# Patient Record
Sex: MALE | Race: WHITE | Employment: FULL TIME | URBAN - METROPOLITAN AREA
[De-identification: names, ages, dates, MRNs, and addresses within clinical notes are randomized per-mention and may not be internally consistent; named-entity substitution may affect disease eponyms.]

---

## 2021-01-25 ENCOUNTER — VIRTUAL VISIT (OUTPATIENT)
Dept: FAMILY MEDICINE CLINIC | Age: 41
End: 2021-01-25
Payer: COMMERCIAL

## 2021-01-25 DIAGNOSIS — Z86.79 HISTORY OF HYPERTENSION: ICD-10-CM

## 2021-01-25 DIAGNOSIS — Z87.438 HISTORY OF PROSTATITIS: ICD-10-CM

## 2021-01-25 DIAGNOSIS — Z79.899 CHRONIC PRESCRIPTION BENZODIAZEPINE USE: ICD-10-CM

## 2021-01-25 DIAGNOSIS — E11.9 DIABETES MELLITUS TYPE 2, DIET-CONTROLLED (HCC): Primary | ICD-10-CM

## 2021-01-25 DIAGNOSIS — Z86.39 HISTORY OF HYPERLIPIDEMIA: ICD-10-CM

## 2021-01-25 DIAGNOSIS — G62.9 PERIPHERAL POLYNEUROPATHY: ICD-10-CM

## 2021-01-25 DIAGNOSIS — D36.7 DERMOID CYST OF NECK: ICD-10-CM

## 2021-01-25 DIAGNOSIS — Z12.5 SCREENING FOR PROSTATE CANCER: ICD-10-CM

## 2021-01-25 PROCEDURE — 99204 OFFICE O/P NEW MOD 45 MIN: CPT | Performed by: FAMILY MEDICINE

## 2021-01-25 RX ORDER — LORAZEPAM 1 MG/1
TABLET ORAL
COMMUNITY
Start: 2021-01-08

## 2021-01-25 RX ORDER — GABAPENTIN 400 MG/1
400 CAPSULE ORAL 2 TIMES DAILY
Qty: 180 CAP | Refills: 1 | Status: SHIPPED | OUTPATIENT
Start: 2021-01-25

## 2021-01-25 NOTE — PROGRESS NOTES
Chief Complaint   Patient presents with   [address]     just moved back to [facility] Referral Follow Up     referral to  endo, uro, - due to DM and derm - due to lump in neck

## 2021-01-25 NOTE — PROGRESS NOTES
Divina Fontanez is a 36 y.o. male who was seen by synchronous (real-time) audio-video technology on 1/25/2021. Consent: Divina Fontanez, who was seen by synchronous (real-time) audio-video technology, and/or his healthcare decision maker, is aware that this patient-initiated, Telehealth encounter on 1/25/2021 is a billable service, with coverage as determined by his insurance carrier. He is aware that he may receive a bill and has provided verbal consent to proceed: Yes. Assessment & Plan:   1. Diabetes mellitus type 2, diet-controlled ([facility] [address]  Start with labs per orders, fasting  Off meds  Consider utility of statin/ace for protection in spite of controlled dm, will address at future encounter and make informed decision based on lab results as well  Does make patient qualify for 1b status for covid vaccine based on my understanding  - LIPID PANEL; Future  - HEMOGLOBIN A1C WITH EAG; Future  - CBC W/O DIFF; Future  - METABOLIC PANEL, COMPREHENSIVE; Future  - MICROALBUMIN, UR, RAND W/ MICROALB/CREAT RATIO; Future    2. History of prostatitis  Pt would like f/u with urology, referral placed  Also due for routine psa screening  - REFERRAL TO UROLOGY  - PSA SCREENING (SCREENING); Future    3. Dermoid cyst of neck  Would like to see derm for recurrent dermoid cyst of L neck  - REFERRAL TO DERMATOLOGY    4. Screening for prostate cancer  As above  - PSA SCREENING (SCREENING); Future    5. Peripheral polyneuropathy  Ongoing for 2 years, mgmt with gabapentin 400 bid,  reviewed, med ordered  - gabapentin (NEURONTIN) 400 mg capsule; Take 1 Cap by mouth two (2) times a day. Max Daily Amount: 800 mg. Dispense: 180 Cap; Refill: 1    6. Chronic prescription benzodiazepine use  Prescribed by psych, discussed risks of chronic benzo use    7. History of hypertension  Pt reports resolved once weight loss had happened    8.  History of hyperlipidemia  Pt reports resolved once weight loss had happened    Go for fasting labs  Will review in results note an f/u if necessary    Pending up coming appt with [facility]          Pt was counseled on risks, benefits and alternatives of treatment options. All questions were asked and answered and the patient was agreeable with the treatment plan  Subjective:   Divina Fontanez is a 36 y.o. male who was seen for Establish Care (just moved back to [place] ) and Referral Follow Up (referral to  endo, uro, - due to DM and derm - due to lump in neck )      Home: house with parents  Work: [employer]  Last PCP: was going regularly but generally was seeing his specialists most often  Specialists: urology, endocrinologists, would like to see dermatology for a cyst behind his ear that is growing    The patient reports he is a diet controlled dm--he was asymptomatic on his diagnosis, he was initially put on insulin and within a few weeks he was having profound hypoglycemia, he stopped any meds, his recheck was OK and he's stayed stable since then, he worked on his diet and he only gets his bg up when he is sick, then he very occasionally stress dosed himself with short acting insulin per his report  Last a1c hasn't been in a while.     Neuropathy: takes gabapentin 400 bid, he has thought it had to do with blood sugars but not clear    He reports he used to weigh about 240 lbs, he exercised and cut diet, he got down to 165 and has held steady since then, he at the time had high bp and hld but that has gotten better for him since the weight loss and maintenance    When the patient was in [place] he was seeing psychiatry, he is on ativan from this specialist, he was having a lot of stress causing his blood sugars to rise, he was put on \"a bunch of different stuff at the beginning\" and he's been on ativan 1 qam, 1/2 q lunch and 1 qpm for about 5 years now  Medications, allergies, PMH, PSH, SOCH, LEONARDO CASTELLON OF [facility] reviewed and updated per routine protocol, see chart for review and changes if not noted here. ROS  A 12 point review of systems was negative except as noted here or in the HPI. Objective:   Vital Signs: (As obtained by patient/caregiver at home)  Patient-Reported Vitals 1/25/2021   Patient-Reported Weight 165lbs        [INSTRUCTIONS:  \"[x]\" Indicates a positive item  \"[]\" Indicates a negative item  -- DELETE ALL ITEMS NOT EXAMINED]    Constitutional: [x] Appears well-developed and well-nourished [x] No apparent distress      [] Abnormal -     Mental status: [x] Alert and awake  [x] Oriented to person/place/time [x] Able to follow commands    [] Abnormal -     Eyes:   EOM    [x]  Normal    [] Abnormal -   Sclera  [x]  Normal    [] Abnormal -          Discharge [x]  None visible   [] Abnormal -     HENT: [x] Normocephalic, atraumatic  [] Abnormal -   [x] Mouth/Throat: Mucous membranes are moist    External Ears [x] Normal  [] Abnormal -    Neck: [x] No visualized mass [] Abnormal -     Pulmonary/Chest: [x] Respiratory effort normal   [x] No visualized signs of difficulty breathing or respiratory distress        [] Abnormal -      Musculoskeletal:   [] Normal gait with no signs of ataxia         [x] Normal range of motion of neck        [] Abnormal -     Neurological:        [x] No Facial Asymmetry (Cranial nerve 7 motor function) (limited exam due to video visit)          [x] No gaze palsy        [] Abnormal -          Skin:        [x] No significant exanthematous lesions or discoloration noted on facial skin         [] Abnormal -            Psychiatric:       [x] Normal Affect [] Abnormal -        [x] No Hallucinations    Other pertinent observable physical exam findings:well appearing, no distress, non toxic    We discussed the expected course, resolution and complications of the diagnosis(es) in detail. Medication risks, benefits, costs, interactions, and alternatives were discussed as indicated.   I advised him to contact the office if his condition worsens, changes or fails to improve as anticipated. He expressed understanding with the diagnosis(es) and plan. Vignesh Carlisle is a 36 y.o. male who was evaluated by a video visit encounter for concerns as above. Patient identification was verified prior to start of the visit. A caregiver was present when appropriate. Due to this being a TeleHealth encounter (During [facility] public health emergency), evaluation of the following organ systems was limited: Vitals/Constitutional/EENT/Resp/CV/GI//MS/Neuro/Skin/Heme-Lymph-Imm. Pursuant to the emergency declaration under the [facility], [facility] waiver authority and the [employer] Act, this Virtual  Visit was conducted, with patient's (and/or legal guardian's) consent, to reduce the patient's risk of exposure to COVID-19 and provide necessary medical care. Services were provided through a video synchronous discussion virtually to substitute for in-person clinic visit. Patient and provider were located at their individual homes. Sonja Kelly MD  [facility]  01/25/21 1:38 PM     Portions of this note may have been populated using smart dictation software and may have \"sounds-like\" errors present.

## 2021-01-26 ENCOUNTER — TELEPHONE (OUTPATIENT)
Dept: FAMILY MEDICINE CLINIC | Age: 41
End: 2021-01-26

## 2021-01-26 NOTE — TELEPHONE ENCOUNTER
----- Message from Sushma Forde MD sent at 1/25/2021  1:59 PM EST -----  Please mail referrals to patients home

## 2024-09-04 ENCOUNTER — HOSPITAL ENCOUNTER (INPATIENT)
Facility: HOSPITAL | Age: 44
LOS: 2 days | Discharge: HOME | End: 2024-09-06
Attending: EMERGENCY MEDICINE | Admitting: STUDENT IN AN ORGANIZED HEALTH CARE EDUCATION/TRAINING PROGRAM
Payer: COMMERCIAL

## 2024-09-04 ENCOUNTER — APPOINTMENT (OUTPATIENT)
Facility: HOSPITAL | Age: 44
End: 2024-09-04
Payer: COMMERCIAL

## 2024-09-04 DIAGNOSIS — F10.931 DTS (DELIRIUM TREMENS) (HCC): Primary | ICD-10-CM

## 2024-09-04 DIAGNOSIS — F10.931 DELIRIUM TREMENS (HCC): ICD-10-CM

## 2024-09-04 DIAGNOSIS — K92.2 UPPER GI BLEED: ICD-10-CM

## 2024-09-04 LAB
ALBUMIN SERPL-MCNC: 3.2 G/DL (ref 3.5–5)
ALBUMIN/GLOB SERPL: 0.8 (ref 1.1–2.2)
ALP SERPL-CCNC: 85 U/L (ref 45–117)
ALT SERPL-CCNC: 91 U/L (ref 12–78)
ANION GAP SERPL CALC-SCNC: 12 MMOL/L (ref 5–15)
APAP SERPL-MCNC: 6 UG/ML (ref 10–30)
AST SERPL-CCNC: 58 U/L (ref 15–37)
BASOPHILS # BLD: 0 K/UL (ref 0–0.1)
BASOPHILS NFR BLD: 0 % (ref 0–1)
BILIRUB SERPL-MCNC: 1.6 MG/DL (ref 0.2–1)
BUN SERPL-MCNC: 4 MG/DL (ref 6–20)
BUN/CREAT SERPL: 5 (ref 12–20)
CALCIUM SERPL-MCNC: 9.5 MG/DL (ref 8.5–10.1)
CHLORIDE SERPL-SCNC: 101 MMOL/L (ref 97–108)
CO2 SERPL-SCNC: 20 MMOL/L (ref 21–32)
CREAT SERPL-MCNC: 0.79 MG/DL (ref 0.7–1.3)
DIFFERENTIAL METHOD BLD: ABNORMAL
EOSINOPHIL # BLD: 0 K/UL (ref 0–0.4)
EOSINOPHIL NFR BLD: 0 % (ref 0–7)
ERYTHROCYTE [DISTWIDTH] IN BLOOD BY AUTOMATED COUNT: 12.4 % (ref 11.5–14.5)
ETHANOL SERPL-MCNC: 63 MG/DL (ref 0–0.08)
ETHANOL SERPL-MCNC: <10 MG/DL (ref 0–0.08)
GLOBULIN SER CALC-MCNC: 3.8 G/DL (ref 2–4)
GLUCOSE BLD STRIP.AUTO-MCNC: 215 MG/DL (ref 65–117)
GLUCOSE SERPL-MCNC: 197 MG/DL (ref 65–100)
HCT VFR BLD AUTO: 42 % (ref 36.6–50.3)
HCT VFR BLD AUTO: 44.9 % (ref 36.6–50.3)
HGB BLD-MCNC: 15.3 G/DL (ref 12.1–17)
HGB BLD-MCNC: 16.2 G/DL (ref 12.1–17)
IMM GRANULOCYTES # BLD AUTO: 0 K/UL
IMM GRANULOCYTES NFR BLD AUTO: 0 %
LIPASE SERPL-CCNC: 21 U/L (ref 13–75)
LYMPHOCYTES # BLD: 0.3 K/UL (ref 0.8–3.5)
LYMPHOCYTES NFR BLD: 11 % (ref 12–49)
MAGNESIUM SERPL-MCNC: 1.7 MG/DL (ref 1.6–2.4)
MCH RBC QN AUTO: 36.6 PG (ref 26–34)
MCHC RBC AUTO-ENTMCNC: 36.1 G/DL (ref 30–36.5)
MCV RBC AUTO: 101.4 FL (ref 80–99)
MONOCYTES # BLD: 0 K/UL (ref 0–1)
MONOCYTES NFR BLD: 1 % (ref 5–13)
NEUTS BAND NFR BLD MANUAL: 8 % (ref 0–6)
NEUTS SEG # BLD: 2.1 K/UL (ref 1.8–8)
NEUTS SEG NFR BLD: 80 % (ref 32–75)
NRBC # BLD: 0 K/UL (ref 0–0.01)
NRBC BLD-RTO: 0 PER 100 WBC
PLATELET # BLD AUTO: 108 K/UL (ref 150–400)
PMV BLD AUTO: 11.8 FL (ref 8.9–12.9)
POTASSIUM SERPL-SCNC: 3.6 MMOL/L (ref 3.5–5.1)
PROT SERPL-MCNC: 7 G/DL (ref 6.4–8.2)
RBC # BLD AUTO: 4.43 M/UL (ref 4.1–5.7)
RBC MORPH BLD: ABNORMAL
SALICYLATES SERPL-MCNC: <1.7 MG/DL (ref 2.8–20)
SERVICE CMNT-IMP: ABNORMAL
SODIUM SERPL-SCNC: 133 MMOL/L (ref 136–145)
TROPONIN I SERPL HS-MCNC: 4 NG/L (ref 0–76)
WBC # BLD AUTO: 2.4 K/UL (ref 4.1–11.1)

## 2024-09-04 PROCEDURE — 82962 GLUCOSE BLOOD TEST: CPT

## 2024-09-04 PROCEDURE — 85014 HEMATOCRIT: CPT

## 2024-09-04 PROCEDURE — 96374 THER/PROPH/DIAG INJ IV PUSH: CPT

## 2024-09-04 PROCEDURE — 2580000003 HC RX 258: Performed by: STUDENT IN AN ORGANIZED HEALTH CARE EDUCATION/TRAINING PROGRAM

## 2024-09-04 PROCEDURE — 85018 HEMOGLOBIN: CPT

## 2024-09-04 PROCEDURE — 82077 ASSAY SPEC XCP UR&BREATH IA: CPT

## 2024-09-04 PROCEDURE — 6360000002 HC RX W HCPCS: Performed by: EMERGENCY MEDICINE

## 2024-09-04 PROCEDURE — 86900 BLOOD TYPING SEROLOGIC ABO: CPT

## 2024-09-04 PROCEDURE — 86850 RBC ANTIBODY SCREEN: CPT

## 2024-09-04 PROCEDURE — 99285 EMERGENCY DEPT VISIT HI MDM: CPT

## 2024-09-04 PROCEDURE — 85025 COMPLETE CBC W/AUTO DIFF WBC: CPT

## 2024-09-04 PROCEDURE — 71045 X-RAY EXAM CHEST 1 VIEW: CPT

## 2024-09-04 PROCEDURE — 83036 HEMOGLOBIN GLYCOSYLATED A1C: CPT

## 2024-09-04 PROCEDURE — 2580000003 HC RX 258: Performed by: EMERGENCY MEDICINE

## 2024-09-04 PROCEDURE — 83690 ASSAY OF LIPASE: CPT

## 2024-09-04 PROCEDURE — 93005 ELECTROCARDIOGRAM TRACING: CPT | Performed by: EMERGENCY MEDICINE

## 2024-09-04 PROCEDURE — 96361 HYDRATE IV INFUSION ADD-ON: CPT

## 2024-09-04 PROCEDURE — 70450 CT HEAD/BRAIN W/O DYE: CPT

## 2024-09-04 PROCEDURE — 80143 DRUG ASSAY ACETAMINOPHEN: CPT

## 2024-09-04 PROCEDURE — 36415 COLL VENOUS BLD VENIPUNCTURE: CPT

## 2024-09-04 PROCEDURE — 80053 COMPREHEN METABOLIC PANEL: CPT

## 2024-09-04 PROCEDURE — 96375 TX/PRO/DX INJ NEW DRUG ADDON: CPT

## 2024-09-04 PROCEDURE — 1100000000 HC RM PRIVATE

## 2024-09-04 PROCEDURE — 86901 BLOOD TYPING SEROLOGIC RH(D): CPT

## 2024-09-04 PROCEDURE — 83735 ASSAY OF MAGNESIUM: CPT

## 2024-09-04 PROCEDURE — 84484 ASSAY OF TROPONIN QUANT: CPT

## 2024-09-04 PROCEDURE — 6370000000 HC RX 637 (ALT 250 FOR IP): Performed by: STUDENT IN AN ORGANIZED HEALTH CARE EDUCATION/TRAINING PROGRAM

## 2024-09-04 PROCEDURE — 80179 DRUG ASSAY SALICYLATE: CPT

## 2024-09-04 RX ORDER — ONDANSETRON 4 MG/1
4 TABLET, ORALLY DISINTEGRATING ORAL EVERY 8 HOURS PRN
Status: DISCONTINUED | OUTPATIENT
Start: 2024-09-04 | End: 2024-09-06 | Stop reason: HOSPADM

## 2024-09-04 RX ORDER — POLYETHYLENE GLYCOL 3350 17 G/17G
17 POWDER, FOR SOLUTION ORAL DAILY PRN
Status: DISCONTINUED | OUTPATIENT
Start: 2024-09-04 | End: 2024-09-06 | Stop reason: HOSPADM

## 2024-09-04 RX ORDER — POTASSIUM CHLORIDE 7.45 MG/ML
10 INJECTION INTRAVENOUS PRN
Status: DISCONTINUED | OUTPATIENT
Start: 2024-09-04 | End: 2024-09-06 | Stop reason: HOSPADM

## 2024-09-04 RX ORDER — PHENOBARBITAL 32.4 MG/1
32.4 TABLET ORAL 4 TIMES DAILY
Status: COMPLETED | OUTPATIENT
Start: 2024-09-04 | End: 2024-09-05

## 2024-09-04 RX ORDER — ONDANSETRON 2 MG/ML
4 INJECTION INTRAMUSCULAR; INTRAVENOUS EVERY 6 HOURS PRN
Status: DISCONTINUED | OUTPATIENT
Start: 2024-09-04 | End: 2024-09-06 | Stop reason: HOSPADM

## 2024-09-04 RX ORDER — 0.9 % SODIUM CHLORIDE 0.9 %
2000 INTRAVENOUS SOLUTION INTRAVENOUS ONCE
Status: COMPLETED | OUTPATIENT
Start: 2024-09-04 | End: 2024-09-04

## 2024-09-04 RX ORDER — MULTIVITAMIN WITH IRON
1 TABLET ORAL DAILY
Status: DISCONTINUED | OUTPATIENT
Start: 2024-09-05 | End: 2024-09-06 | Stop reason: HOSPADM

## 2024-09-04 RX ORDER — ACETAMINOPHEN 650 MG/1
650 SUPPOSITORY RECTAL EVERY 6 HOURS PRN
Status: DISCONTINUED | OUTPATIENT
Start: 2024-09-04 | End: 2024-09-06 | Stop reason: HOSPADM

## 2024-09-04 RX ORDER — PHENOBARBITAL 32.4 MG/1
32.4 TABLET ORAL 2 TIMES DAILY
Status: COMPLETED | OUTPATIENT
Start: 2024-09-05 | End: 2024-09-06

## 2024-09-04 RX ORDER — FOLIC ACID 1 MG/1
1 TABLET ORAL DAILY
Status: DISCONTINUED | OUTPATIENT
Start: 2024-09-05 | End: 2024-09-06 | Stop reason: HOSPADM

## 2024-09-04 RX ORDER — GAUZE BANDAGE 2" X 2"
100 BANDAGE TOPICAL DAILY
Status: DISCONTINUED | OUTPATIENT
Start: 2024-09-05 | End: 2024-09-06 | Stop reason: HOSPADM

## 2024-09-04 RX ORDER — INSULIN LISPRO 100 [IU]/ML
0-8 INJECTION, SOLUTION INTRAVENOUS; SUBCUTANEOUS EVERY 4 HOURS
Status: DISCONTINUED | OUTPATIENT
Start: 2024-09-04 | End: 2024-09-06 | Stop reason: HOSPADM

## 2024-09-04 RX ORDER — SODIUM CHLORIDE 0.9 % (FLUSH) 0.9 %
5-40 SYRINGE (ML) INJECTION EVERY 12 HOURS SCHEDULED
Status: DISCONTINUED | OUTPATIENT
Start: 2024-09-04 | End: 2024-09-06 | Stop reason: HOSPADM

## 2024-09-04 RX ORDER — SODIUM CHLORIDE 0.9 % (FLUSH) 0.9 %
5-40 SYRINGE (ML) INJECTION PRN
Status: DISCONTINUED | OUTPATIENT
Start: 2024-09-04 | End: 2024-09-06 | Stop reason: HOSPADM

## 2024-09-04 RX ORDER — PHENOBARBITAL 32.4 MG/1
16.2 TABLET ORAL 2 TIMES DAILY
Status: DISCONTINUED | OUTPATIENT
Start: 2024-09-06 | End: 2024-09-06 | Stop reason: HOSPADM

## 2024-09-04 RX ORDER — SODIUM CHLORIDE 9 MG/ML
INJECTION, SOLUTION INTRAVENOUS PRN
Status: DISCONTINUED | OUTPATIENT
Start: 2024-09-04 | End: 2024-09-06 | Stop reason: HOSPADM

## 2024-09-04 RX ORDER — LORAZEPAM 2 MG/ML
2 INJECTION INTRAMUSCULAR ONCE
Status: DISCONTINUED | OUTPATIENT
Start: 2024-09-04 | End: 2024-09-06 | Stop reason: HOSPADM

## 2024-09-04 RX ORDER — POTASSIUM CHLORIDE 750 MG/1
40 TABLET, EXTENDED RELEASE ORAL PRN
Status: DISCONTINUED | OUTPATIENT
Start: 2024-09-04 | End: 2024-09-06 | Stop reason: HOSPADM

## 2024-09-04 RX ORDER — PHENOBARBITAL 32.4 MG/1
16.2 TABLET ORAL EVERY 6 HOURS PRN
Status: DISCONTINUED | OUTPATIENT
Start: 2024-09-06 | End: 2024-09-06 | Stop reason: HOSPADM

## 2024-09-04 RX ORDER — DEXTROSE MONOHYDRATE 100 MG/ML
INJECTION, SOLUTION INTRAVENOUS CONTINUOUS PRN
Status: DISCONTINUED | OUTPATIENT
Start: 2024-09-04 | End: 2024-09-06 | Stop reason: HOSPADM

## 2024-09-04 RX ORDER — OCTREOTIDE ACETATE 100 UG/ML
50 INJECTION, SOLUTION INTRAVENOUS; SUBCUTANEOUS ONCE
Status: COMPLETED | OUTPATIENT
Start: 2024-09-04 | End: 2024-09-04

## 2024-09-04 RX ORDER — PHENOBARBITAL 32.4 MG/1
32.4 TABLET ORAL EVERY 6 HOURS PRN
Status: DISCONTINUED | OUTPATIENT
Start: 2024-09-04 | End: 2024-09-06 | Stop reason: HOSPADM

## 2024-09-04 RX ORDER — ACETAMINOPHEN 325 MG/1
650 TABLET ORAL EVERY 6 HOURS PRN
Status: DISCONTINUED | OUTPATIENT
Start: 2024-09-04 | End: 2024-09-06 | Stop reason: HOSPADM

## 2024-09-04 RX ORDER — LORAZEPAM 2 MG/ML
1 INJECTION INTRAMUSCULAR ONCE
Status: COMPLETED | OUTPATIENT
Start: 2024-09-04 | End: 2024-09-04

## 2024-09-04 RX ORDER — MAGNESIUM SULFATE IN WATER 40 MG/ML
2000 INJECTION, SOLUTION INTRAVENOUS PRN
Status: DISCONTINUED | OUTPATIENT
Start: 2024-09-04 | End: 2024-09-06 | Stop reason: HOSPADM

## 2024-09-04 RX ADMIN — SODIUM CHLORIDE, PRESERVATIVE FREE 10 ML: 5 INJECTION INTRAVENOUS at 22:10

## 2024-09-04 RX ADMIN — LORAZEPAM 1 MG: 2 INJECTION INTRAMUSCULAR; INTRAVENOUS at 19:34

## 2024-09-04 RX ADMIN — PANTOPRAZOLE SODIUM 80 MG: 40 INJECTION, POWDER, FOR SOLUTION INTRAVENOUS at 19:30

## 2024-09-04 RX ADMIN — SODIUM CHLORIDE 2000 ML: 9 INJECTION, SOLUTION INTRAVENOUS at 19:42

## 2024-09-04 RX ADMIN — OCTREOTIDE ACETATE 50 MCG: 100 INJECTION, SOLUTION INTRAVENOUS; SUBCUTANEOUS at 19:39

## 2024-09-04 RX ADMIN — PHENOBARBITAL 32.4 MG: 32.4 TABLET ORAL at 22:10

## 2024-09-04 ASSESSMENT — ENCOUNTER SYMPTOMS
COLOR CHANGE: 0
VOMITING: 0
BACK PAIN: 0
SHORTNESS OF BREATH: 0
NAUSEA: 0
DIARRHEA: 0
CONSTIPATION: 0
ABDOMINAL PAIN: 0

## 2024-09-04 ASSESSMENT — PAIN - FUNCTIONAL ASSESSMENT: PAIN_FUNCTIONAL_ASSESSMENT: NONE - DENIES PAIN

## 2024-09-04 NOTE — ED TRIAGE NOTES
Pt presents with EMS for c/o near syncopal episode. Denies LOC or fall/injury. Endorses lethargy. Per EMS low BP and ST in route. .     Hx ETOH abuse.

## 2024-09-04 NOTE — ED PROVIDER NOTES
[facility] EMERGENCY DEPT  EMERGENCY DEPARTMENT ENCOUNTER      Pt Name: Alex Sanchez  MRN: [id number]  Birthdate 1980  Date of evaluation: 9/4/2024  Provider: Irving Gracia MD    CHIEF COMPLAINT       Chief Complaint   Patient presents with    Fatigue         HISTORY OF PRESENT ILLNESS   (Location/Symptom, Timing/Onset, Context/Setting, Quality, Duration, Modifying Factors, Severity)  Note limiting factors.   Alex Sanchez is a 44 y.o. male who presents to the emergency department      The history is provided by the patient, a relative and the EMS personnel. No  was used.   Loss of Consciousness  Episode history:  Single  Most recent episode:  Today  Chronicity:  New  Witnessed: yes    Ineffective treatments:  None tried  Associated symptoms: no chest pain, no confusion, no dizziness, no fever, no headaches, no nausea, no palpitations, no seizures, no shortness of breath, no vomiting and no weakness        Nursing Notes were reviewed.    REVIEW OF SYSTEMS    (2-9 systems for level 4, 10 or more for level 5)     Review of Systems   Constitutional:  Positive for fatigue. Negative for activity change, chills and fever.   HENT:  Negative for nosebleeds.    Eyes:  Negative for visual disturbance.   Respiratory:  Negative for shortness of breath.    Cardiovascular:  Positive for syncope. Negative for chest pain and palpitations.   Gastrointestinal:  Negative for abdominal pain, constipation, diarrhea, nausea and vomiting.   Genitourinary:  Negative for difficulty urinating, dysuria, hematuria and urgency.   Musculoskeletal:  Negative for back pain, neck pain and neck stiffness.   Skin:  Negative for color change.   Allergic/Immunologic: Negative for immunocompromised state.   Neurological:  Negative for dizziness, seizures, syncope, weakness, light-headedness, numbness and headaches.   Psychiatric/Behavioral:  Negative for behavioral problems, confusion, hallucinations, self-injury and

## 2024-09-05 ENCOUNTER — ANESTHESIA EVENT (OUTPATIENT)
Facility: HOSPITAL | Age: 44
End: 2024-09-05
Payer: COMMERCIAL

## 2024-09-05 ENCOUNTER — ANESTHESIA (OUTPATIENT)
Facility: HOSPITAL | Age: 44
End: 2024-09-05
Payer: COMMERCIAL

## 2024-09-05 LAB
ABO + RH BLD: NORMAL
AMPHET UR QL SCN: NEGATIVE
ANION GAP SERPL CALC-SCNC: 8 MMOL/L (ref 2–12)
APPEARANCE UR: ABNORMAL
BACTERIA URNS QL MICRO: ABNORMAL /HPF
BARBITURATES UR QL SCN: NEGATIVE
BASOPHILS # BLD: 0 K/UL (ref 0–0.1)
BASOPHILS NFR BLD: 0 % (ref 0–1)
BENZODIAZ UR QL: NEGATIVE
BILIRUB UR QL: NEGATIVE
BLOOD GROUP ANTIBODIES SERPL: NORMAL
BUN SERPL-MCNC: 4 MG/DL (ref 6–20)
BUN/CREAT SERPL: 5 (ref 12–20)
CALCIUM SERPL-MCNC: 8.6 MG/DL (ref 8.5–10.1)
CANNABINOIDS UR QL SCN: NEGATIVE
CHLORIDE SERPL-SCNC: 102 MMOL/L (ref 97–108)
CO2 SERPL-SCNC: 23 MMOL/L (ref 21–32)
COCAINE UR QL SCN: NEGATIVE
COLOR UR: ABNORMAL
CREAT SERPL-MCNC: 0.73 MG/DL (ref 0.7–1.3)
DIFFERENTIAL METHOD BLD: ABNORMAL
EKG ATRIAL RATE: 144 BPM
EKG DIAGNOSIS: NORMAL
EKG P AXIS: 67 DEGREES
EKG P-R INTERVAL: 128 MS
EKG Q-T INTERVAL: 342 MS
EKG QRS DURATION: 68 MS
EKG QTC CALCULATION (BAZETT): 529 MS
EKG R AXIS: 78 DEGREES
EKG T AXIS: 92 DEGREES
EKG VENTRICULAR RATE: 144 BPM
EOSINOPHIL # BLD: 0 K/UL (ref 0–0.4)
EOSINOPHIL NFR BLD: 0 % (ref 0–7)
EPITH CASTS URNS QL MICRO: ABNORMAL /LPF
ERYTHROCYTE [DISTWIDTH] IN BLOOD BY AUTOMATED COUNT: 12.4 % (ref 11.5–14.5)
EST. AVERAGE GLUCOSE BLD GHB EST-MCNC: 212 MG/DL
EST. AVERAGE GLUCOSE BLD GHB EST-MCNC: 217 MG/DL
EST. AVERAGE GLUCOSE BLD GHB EST-MCNC: 235 MG/DL
FOLATE SERPL-MCNC: 2.7 NG/ML (ref 5–21)
GLUCOSE BLD STRIP.AUTO-MCNC: 160 MG/DL (ref 65–117)
GLUCOSE BLD STRIP.AUTO-MCNC: 172 MG/DL (ref 65–117)
GLUCOSE BLD STRIP.AUTO-MCNC: 182 MG/DL (ref 65–117)
GLUCOSE BLD STRIP.AUTO-MCNC: 200 MG/DL (ref 65–117)
GLUCOSE BLD STRIP.AUTO-MCNC: 230 MG/DL (ref 65–117)
GLUCOSE SERPL-MCNC: 224 MG/DL (ref 65–100)
GLUCOSE UR STRIP.AUTO-MCNC: 500 MG/DL
HBA1C MFR BLD: 9 % (ref 4–5.6)
HBA1C MFR BLD: 9.2 % (ref 4–5.6)
HBA1C MFR BLD: 9.8 % (ref 4–5.6)
HCT VFR BLD AUTO: 35.3 % (ref 36.6–50.3)
HCT VFR BLD AUTO: 35.8 % (ref 36.6–50.3)
HCT VFR BLD AUTO: 36.1 % (ref 36.6–50.3)
HELIOBACTER PYLORI ID: NORMAL
HGB BLD-MCNC: 12.7 G/DL (ref 12.1–17)
HGB BLD-MCNC: 12.7 G/DL (ref 12.1–17)
HGB BLD-MCNC: 13.2 G/DL (ref 12.1–17)
HGB UR QL STRIP: NEGATIVE
HYALINE CASTS URNS QL MICRO: ABNORMAL /LPF (ref 0–2)
IMM GRANULOCYTES # BLD AUTO: 0.2 K/UL (ref 0–0.04)
IMM GRANULOCYTES NFR BLD AUTO: 2 % (ref 0–0.5)
INR PPP: 1.1 (ref 0.9–1.1)
KETONES UR QL STRIP.AUTO: ABNORMAL MG/DL
LEUKOCYTE ESTERASE UR QL STRIP.AUTO: ABNORMAL
LYMPHOCYTES # BLD: 0.2 K/UL (ref 0.8–3.5)
LYMPHOCYTES NFR BLD: 2 % (ref 12–49)
Lab: NORMAL
MCH RBC QN AUTO: 36 PG (ref 26–34)
MCHC RBC AUTO-ENTMCNC: 35.5 G/DL (ref 30–36.5)
MCV RBC AUTO: 101.4 FL (ref 80–99)
METHADONE UR QL: NEGATIVE
MONOCYTES # BLD: 0.3 K/UL (ref 0–1)
MONOCYTES NFR BLD: 3 % (ref 5–13)
NEUTS SEG # BLD: 9.5 K/UL (ref 1.8–8)
NEUTS SEG NFR BLD: 93 % (ref 32–75)
NITRITE UR QL STRIP.AUTO: NEGATIVE
NRBC # BLD: 0 K/UL (ref 0–0.01)
NRBC BLD-RTO: 0 PER 100 WBC
OPIATES UR QL: NEGATIVE
PCP UR QL: NEGATIVE
PH UR STRIP: 5 (ref 5–8)
PLATELET # BLD AUTO: 66 K/UL (ref 150–400)
PMV BLD AUTO: 12.2 FL (ref 8.9–12.9)
POTASSIUM SERPL-SCNC: 3.9 MMOL/L (ref 3.5–5.1)
PROT UR STRIP-MCNC: NEGATIVE MG/DL
PROTHROMBIN TIME: 11.9 SEC (ref 9–11.1)
RBC # BLD AUTO: 3.53 M/UL (ref 4.1–5.7)
RBC #/AREA URNS HPF: ABNORMAL /HPF (ref 0–5)
RBC MORPH BLD: ABNORMAL
SERVICE CMNT-IMP: ABNORMAL
SODIUM SERPL-SCNC: 133 MMOL/L (ref 136–145)
SP GR UR REFRACTOMETRY: 1.01 (ref 1–1.03)
SPECIMEN EXP DATE BLD: NORMAL
TSH SERPL DL<=0.05 MIU/L-ACNC: 0.46 UIU/ML (ref 0.36–3.74)
URINE CULTURE IF INDICATED: ABNORMAL
UROBILINOGEN UR QL STRIP.AUTO: 0.2 EU/DL (ref 0.2–1)
VIT B12 SERPL-MCNC: 1263 PG/ML (ref 193–986)
WBC # BLD AUTO: 10.2 K/UL (ref 4.1–11.1)
WBC URNS QL MICRO: ABNORMAL /HPF (ref 0–4)

## 2024-09-05 PROCEDURE — 84443 ASSAY THYROID STIM HORMONE: CPT

## 2024-09-05 PROCEDURE — 82607 VITAMIN B-12: CPT

## 2024-09-05 PROCEDURE — 6360000002 HC RX W HCPCS: Performed by: NURSE ANESTHETIST, CERTIFIED REGISTERED

## 2024-09-05 PROCEDURE — 94761 N-INVAS EAR/PLS OXIMETRY MLT: CPT

## 2024-09-05 PROCEDURE — 6360000002 HC RX W HCPCS: Performed by: STUDENT IN AN ORGANIZED HEALTH CARE EDUCATION/TRAINING PROGRAM

## 2024-09-05 PROCEDURE — 6370000000 HC RX 637 (ALT 250 FOR IP): Performed by: STUDENT IN AN ORGANIZED HEALTH CARE EDUCATION/TRAINING PROGRAM

## 2024-09-05 PROCEDURE — 81001 URINALYSIS AUTO W/SCOPE: CPT

## 2024-09-05 PROCEDURE — 6370000000 HC RX 637 (ALT 250 FOR IP): Performed by: HOSPITALIST

## 2024-09-05 PROCEDURE — 3600007502: Performed by: INTERNAL MEDICINE

## 2024-09-05 PROCEDURE — 87086 URINE CULTURE/COLONY COUNT: CPT

## 2024-09-05 PROCEDURE — 7100000011 HC PHASE II RECOVERY - ADDTL 15 MIN: Performed by: INTERNAL MEDICINE

## 2024-09-05 PROCEDURE — 7100000010 HC PHASE II RECOVERY - FIRST 15 MIN: Performed by: INTERNAL MEDICINE

## 2024-09-05 PROCEDURE — 0DB68ZX EXCISION OF STOMACH, VIA NATURAL OR ARTIFICIAL OPENING ENDOSCOPIC, DIAGNOSTIC: ICD-10-PCS | Performed by: INTERNAL MEDICINE

## 2024-09-05 PROCEDURE — 87088 URINE BACTERIA CULTURE: CPT

## 2024-09-05 PROCEDURE — 2060000000 HC ICU INTERMEDIATE R&B

## 2024-09-05 PROCEDURE — 80048 BASIC METABOLIC PNL TOTAL CA: CPT

## 2024-09-05 PROCEDURE — 2709999900 HC NON-CHARGEABLE SUPPLY: Performed by: INTERNAL MEDICINE

## 2024-09-05 PROCEDURE — 2580000003 HC RX 258: Performed by: STUDENT IN AN ORGANIZED HEALTH CARE EDUCATION/TRAINING PROGRAM

## 2024-09-05 PROCEDURE — 82962 GLUCOSE BLOOD TEST: CPT

## 2024-09-05 PROCEDURE — 82746 ASSAY OF FOLIC ACID SERUM: CPT

## 2024-09-05 PROCEDURE — 3700000001 HC ADD 15 MINUTES (ANESTHESIA): Performed by: INTERNAL MEDICINE

## 2024-09-05 PROCEDURE — 88305 TISSUE EXAM BY PATHOLOGIST: CPT

## 2024-09-05 PROCEDURE — 85025 COMPLETE CBC W/AUTO DIFF WBC: CPT

## 2024-09-05 PROCEDURE — 87186 SC STD MICRODIL/AGAR DIL: CPT

## 2024-09-05 PROCEDURE — 85014 HEMATOCRIT: CPT

## 2024-09-05 PROCEDURE — 85610 PROTHROMBIN TIME: CPT

## 2024-09-05 PROCEDURE — 85018 HEMOGLOBIN: CPT

## 2024-09-05 PROCEDURE — 80307 DRUG TEST PRSMV CHEM ANLYZR: CPT

## 2024-09-05 PROCEDURE — 3700000000 HC ANESTHESIA ATTENDED CARE: Performed by: INTERNAL MEDICINE

## 2024-09-05 PROCEDURE — 3600007512: Performed by: INTERNAL MEDICINE

## 2024-09-05 PROCEDURE — 93010 ELECTROCARDIOGRAM REPORT: CPT | Performed by: SPECIALIST

## 2024-09-05 PROCEDURE — 36415 COLL VENOUS BLD VENIPUNCTURE: CPT

## 2024-09-05 PROCEDURE — 83036 HEMOGLOBIN GLYCOSYLATED A1C: CPT

## 2024-09-05 RX ORDER — SUCCINYLCHOLINE CHLORIDE 20 MG/ML
INJECTION INTRAMUSCULAR; INTRAVENOUS PRN
Status: DISCONTINUED | OUTPATIENT
Start: 2024-09-05 | End: 2024-09-05 | Stop reason: SDUPTHER

## 2024-09-05 RX ORDER — SODIUM CHLORIDE 9 MG/ML
INJECTION, SOLUTION INTRAVENOUS CONTINUOUS
Status: DISCONTINUED | OUTPATIENT
Start: 2024-09-05 | End: 2024-09-05 | Stop reason: HOSPADM

## 2024-09-05 RX ORDER — LORAZEPAM 0.5 MG/1
0.25 TABLET ORAL DAILY
Status: DISCONTINUED | OUTPATIENT
Start: 2024-09-05 | End: 2024-09-06 | Stop reason: HOSPADM

## 2024-09-05 RX ORDER — PROPOFOL 10 MG/ML
INJECTION, EMULSION INTRAVENOUS PRN
Status: DISCONTINUED | OUTPATIENT
Start: 2024-09-05 | End: 2024-09-05 | Stop reason: SDUPTHER

## 2024-09-05 RX ADMIN — PHENOBARBITAL 32.4 MG: 32.4 TABLET ORAL at 11:51

## 2024-09-05 RX ADMIN — FOLIC ACID 1 MG: 1 TABLET ORAL at 07:42

## 2024-09-05 RX ADMIN — SUCCINYLCHOLINE CHLORIDE 100 MG: 20 INJECTION, SOLUTION INTRAMUSCULAR; INTRAVENOUS at 09:39

## 2024-09-05 RX ADMIN — PHENOBARBITAL 32.4 MG: 32.4 TABLET ORAL at 18:13

## 2024-09-05 RX ADMIN — ONDANSETRON 4 MG: 2 INJECTION INTRAMUSCULAR; INTRAVENOUS at 03:01

## 2024-09-05 RX ADMIN — SODIUM CHLORIDE, PRESERVATIVE FREE 10 ML: 5 INJECTION INTRAVENOUS at 07:43

## 2024-09-05 RX ADMIN — LORAZEPAM 0.25 MG: 0.5 TABLET ORAL at 20:25

## 2024-09-05 RX ADMIN — SODIUM CHLORIDE, PRESERVATIVE FREE 10 ML: 5 INJECTION INTRAVENOUS at 20:26

## 2024-09-05 RX ADMIN — PANTOPRAZOLE SODIUM 40 MG: 40 INJECTION, POWDER, FOR SOLUTION INTRAVENOUS at 07:43

## 2024-09-05 RX ADMIN — Medication 100 MG: at 07:42

## 2024-09-05 RX ADMIN — PROPOFOL 100 MG: 10 INJECTION, EMULSION INTRAVENOUS at 09:38

## 2024-09-05 RX ADMIN — PHENOBARBITAL 32.4 MG: 32.4 TABLET ORAL at 07:42

## 2024-09-05 RX ADMIN — PROPOFOL 250 MG: 10 INJECTION, EMULSION INTRAVENOUS at 09:39

## 2024-09-05 RX ADMIN — THERA TABS 1 TABLET: TAB at 07:42

## 2024-09-05 RX ADMIN — PHENOBARBITAL 32.4 MG: 32.4 TABLET ORAL at 20:25

## 2024-09-05 RX ADMIN — SODIUM CHLORIDE, PRESERVATIVE FREE 10 ML: 5 INJECTION INTRAVENOUS at 20:25

## 2024-09-05 ASSESSMENT — PAIN - FUNCTIONAL ASSESSMENT: PAIN_FUNCTIONAL_ASSESSMENT: 0-10

## 2024-09-05 ASSESSMENT — LIFESTYLE VARIABLES: SMOKING_STATUS: 1

## 2024-09-05 ASSESSMENT — PAIN SCALES - GENERAL
PAINLEVEL_OUTOF10: 0
PAINLEVEL_OUTOF10: 0

## 2024-09-05 NOTE — PROGRESS NOTES
[facility] INITIAL ASSESSMENT.      Name: Alex Sanchez   : 1980   MRN: [id number]   Date: 2024            Problem list:     Alcohol abuse  Concern for upper GI bleed  Thrombocytopenia    Assessment/Plan:     Chart reviewed.  Discussed with patient's mother.  Patient was out of the unit for endoscopy.  When attempted to evaluate again, he had been transferred to medical floor.  Results of endoscopy reviewed.  In the absence of variceal/active GI bleed, agree with not requiring ICU level of care.  Please inform ICU in case of any changes in clinical status.    Rik Zambrano MD   Pulmonary/CCM  [facility]  [phone]  2024

## 2024-09-05 NOTE — ANESTHESIA POSTPROCEDURE EVALUATION
Department of Anesthesiology  Postprocedure Note    Patient: Alex Sanchez  MRN: [id number]  YOB: 1980  Date of evaluation: 9/5/2024    Procedure Summary       Date: 09/05/24 Room / Location: [place] [address] / [facility] ENDOSCOPY    Anesthesia Start: 0935 Anesthesia Stop: 0958    Procedures:       ESOPHAGOGASTRODUODENOSCOPY (Upper GI Region)      ESOPHAGOGASTRODUODENOSCOPY BIOPSY (Upper GI Region) Diagnosis:       Acute gastrointestinal hemorrhage      (Acute gastrointestinal hemorrhage [K92.2])    Surgeons: Abner Serrano MD Responsible Provider: Julián Mondragon Jr., DO    Anesthesia Type: General ASA Status: 3            Anesthesia Type: General    Shawn Phase I:      Shawn Phase II: Shawn Score: 10    Anesthesia Post Evaluation    Patient location during evaluation: PACU  Patient participation: complete - patient participated  Level of consciousness: awake and alert  Pain score: 0  Airway patency: patent  Nausea & Vomiting: no nausea and no vomiting  Cardiovascular status: blood pressure returned to baseline and hemodynamically stable  Respiratory status: acceptable  Hydration status: euvolemic  Pain management: adequate    No notable events documented.

## 2024-09-05 NOTE — CARE COORDINATION
Case Management Assessment  Initial Evaluation    Date/Time of Evaluation: 9/5/2024 11:34 AM  Assessment Completed by: PIPPA XIAO RN    If patient is discharged prior to next notation, then this note serves as note for discharge by case management.    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]  DTs (delirium tremens) (HCC) [F10.931]  Upper GI bleed [K92.2]                   Date / Time: 9/4/2024  6:52 PM    Patient Admission Status: Inpatient   Readmission Risk (Low < 19, Mod (19-27), High > 27): Readmission Risk Score: 7.3    Current PCP: Marysol Holliday MD    Chart Reviewed: yes        Hospitalization in the last 30 days (Readmission):  no        Advance Directives:      Code Status: Full Code   Patient's Primary Decision Maker is:  emergency contact listed is mother:Lillian Sanchez @ [phone]    Financial    Payor: UMR / Plan: UMR / Product Type: *No Product type* /     Does insurance require precert for SNF: yes    Potential assistance Purchasing Medications:  no  Meds-to-Beds request: no    No Pharmacies Listed    Additional Case Management Notes:   Pt is off the unit in endoscopy  Per iDR discussion,case management will provide pt with resources for IOP and inpatient rehab if he is willing to go.Also,will see if pt is amenable to talking with a peer counselor that can be arranged also.      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

## 2024-09-05 NOTE — OP NOTE
[facility]  [address] [place] [address]       [phone]                   2024                EGD Operative Report  Alex Sanchez  :  1980  [facility] Medical Record Number:  [id number]      Indication: Hematemesis, Vomiting, persitent of unclear etilogy     : Abner Serrano MD    Assistants: None    Referring Provider:  Marysol Holliday MD      Anesthesia/Sedation:  General anesthesia    Airway assessment: No airway problems anticipated    Pre-Procedural Exam:      Airway: clear, no airway problems anticipated  Heart: RRR, without gallops or rubs  Lungs: clear bilaterally without wheezes, crackles, or rhonchi  Abdomen: soft, nontender, nondistended, bowel sounds present  Mental Status: awake, alert and oriented to person, place and time       Procedure Details     After infomed consent was obtained for the procedure, with all risks and benefits of procedure explained the patient was taken to the endoscopy suite and placed in the left lateral decubitus position.  Following sequential administration of sedation as per above, the endoscope was inserted into the mouth and advanced under direct vision to second portion of the duodenum.  A careful inspection was made as the gastroscope was withdrawn, including a retroflexed view of the proximal stomach; findings and interventions are described below.      Findings:   Esophagus:normal proximal esophagus.                       Distal esophagus with grade B esophagitis  Stomach:   Mild patchy erythematous gastritis in body fundus.  Rest of the examined stomach was normal mucosa.  Bx taken for histology   EMANUEL test performed    Duodenum/jejunum: normal    Therapies:  biopsy of stomach body, antrum    Specimens:  as above    Implants: none           Complications:   None; patient tolerated the procedure well.    EBL:  None.           Impression:   Esophagitis                         No varices and/or

## 2024-09-05 NOTE — PERIOP NOTE
Alex Sanchez  1980  [id number]    Situation:  Verbal report received from: Hernandez  Procedure: Procedure(s):  ESOPHAGOGASTRODUODENOSCOPY  ESOPHAGOGASTRODUODENOSCOPY BIOPSY    Background:    Preoperative diagnosis: Acute gastrointestinal hemorrhage [K92.2]  Postoperative diagnosis: * No post-op diagnosis entered *    :  Dr. Serrano  Assistant(s): Circulator: Colette Brannon RN    Specimens:   ID Type Source Tests Collected by Time Destination   1 : gastric antrum bx Tissue Gastric SURGICAL PATHOLOGY Abner Serrano MD 9/5/2024 0950      H. Pylori test: yes    Assessment:    Anesthesia gave intra-procedure sedation and medications, see anesthesia flow sheet     Intravenous fluids: NS@ KVO     Vital signs stable yes    Abdominal assessment: round and soft yes    Recommendation:    Return to floor     Permission to share finding with family or friend yes

## 2024-09-05 NOTE — PROGRESS NOTES
Physical Therapy Note:  Chart reviewed in preparation for evaluation. Patient currently LILLIANA for an EGD. Will defer and continue to follow.  Ranjit Louis, PT

## 2024-09-05 NOTE — PROGRESS NOTES
attended rounds in the ICU as part of the Interdisciplinary team where the patient's ongoing care was discussed. Please contact [facility] for further referrals.    Brian Renteria MDiv  Staff   Paging Service [phone] (JOSE)

## 2024-09-05 NOTE — PROGRESS NOTES
Spiritual Health Assessment/Progress Note  [facility]    Rituals, Rites and Sacraments,  ,  ,      Name: Alex Sanchez MRN: [id number]    Age: 44 y.o.     Sex: male   Language: English   Judaism: Pentecostal   Delirium tremens (HCC)     Date: 9/5/2024            Total Time Calculated: 5 min              Spiritual Assessment began in [facility] A4 INTENSIVE CARE UNIT        Referral/Consult From: Clergy/   Encounter Overview/Reason: Rituals, Rites and Sacraments  Service Provided For: Patient    Charlene, Belief, Meaning:   Patient unable to communicate at this time  Family/Friends No family/friends present      Importance and Influence:  Patient unable to communicate at this time  Family/Friends no family/friends present    Community:  Patient Other: [id number]  Family/Friends Other: unknown    Assessment and Plan of Care:     Patient Interventions include: Provided sacramental/Anabaptist ritual  Family/Friends Interventions include: Other: none    Patient Plan of Care: Spiritual Care available upon further referral  Family/Friends Plan of Care: Spiritual Care available upon further referral    Electronically signed by Chaplain JESUS on 9/5/2024 at 3:35 PM     [employer] [facility] visit attempted.  Mr. Sanchez is Pentecostal. He was asleep at the time of the visit. No family was present. Prayer for spiritual communion offered outside his room.     Sr. LYNSEY Snow, RN, ACSW, LCSW   Page:  [phone]

## 2024-09-05 NOTE — PERIOP NOTE
Awaiting transport to transfer pt via stretcher to  pt resting comfortably will continue to monitor pt given warm blanket.

## 2024-09-05 NOTE — PERIOP NOTE
TRANSFER - OUT REPORT:    Verbal report given to LETICIA Ambriz on Alex Sanchez  being transferred to [facility] for routine progression of patient care       Report consisted of patient's Situation, Background, Assessment and   Recommendations(SBAR).     Information from the following report(s) Nurse Handoff Report and Cardiac Rhythm RSR  was reviewed with the receiving nurse.           Lines:   Peripheral IV 09/04/24 Right;Anterior Forearm (Active)   Site Assessment Clean, dry & intact 09/05/24 1006   Line Status Normal saline locked 09/05/24 1006   Line Care Connections checked and tightened 09/05/24 1006   Phlebitis Assessment No symptoms 09/05/24 1006   Infiltration Assessment 0 09/05/24 1006   Alcohol Cap Used Yes 09/05/24 1006   Dressing Status Clean, dry & intact 09/05/24 1006   Dressing Type Transparent 09/05/24 1006        Opportunity for questions and clarification was provided.      Patient transported with:  Monitor and Registered Nurse

## 2024-09-05 NOTE — ANESTHESIA PRE PROCEDURE
Applicable): No results found for: \"PREGTESTUR\", \"PREGSERUM\", \"HCG\", \"HCGQUANT\"     ABGs: No results found for: \"PHART\", \"PO2ART\", \"JGZ1XCJ\", \"BWD4AKH\", \"BEART\", \"T2EBTLAX\"     Type & Screen (If Applicable):  No results found for: \"LABABO\"    Drug/Infectious Status (If Applicable):  No results found for: \"HIV\", \"HEPCAB\"    COVID-19 Screening (If Applicable): No results found for: \"COVID19\"        Anesthesia Evaluation  Patient summary reviewed and Nursing notes reviewed   no history of anesthetic complications:   Airway: Mallampati: II  TM distance: >3 FB   Neck ROM: full  Mouth opening: > = 3 FB   Dental:    (+) poor dentition      Pulmonary:   (+)           current smoker                           Cardiovascular:Negative CV ROS            Rhythm: regular  Rate: normal                    Neuro/Psych:               GI/Hepatic/Renal:            ROS comment: ETOH abuse, Hx. Of withdraw/seizures.   Endo/Other:    (+) Diabetes.                 Abdominal: normal exam            Vascular:          Other Findings:       Anesthesia Plan      general     ASA 3     (Pt. Actively Nauseous prior to procedure, plan for ETT as precaution.)  Induction: intravenous.      Anesthetic plan and risks discussed with patient.      Plan discussed with MISSY.                Julián Mondragon Jr, DO   9/5/2024

## 2024-09-05 NOTE — H&P
Hospitalist Admission Note    NAME:  Alex Sanchez   :  1980   MRN:  [id number]     Date/Time:  2024 9:08 PM    Patient PCP: Marysol Holliday MD  ________________________________________________________________________    Given the patient's current clinical presentation, I have a high level of concern for decompensation if discharged from the emergency department.  Complex decision making was performed, which includes reviewing the patient's available past medical records, laboratory results, and x-ray films.       My assessment of this patient's clinical condition and my plan of care is as follows.    Assessment / Plan:    Delirium tremens: POA. Acute. Last etoh cca 48 hours. Etoh still present in blood but low. Reportedly no previous withdrawal per patient but appears to have poor insight and not good historian. Shaking, fall without LOC, irritable. No hallucinations  - admit to ICU  - CIWA with benzo  - Phenobarb taper  - Monitor closely  - ICU consult. Warm handoff by ER doc given already, appreciate    Upper GIB / hematemesis: POA. One episode per patient - very high volume in ED. HGB appears stable for now. Variceal? S/p octreotide in ED  - trend HGB  - Type and screen  - PPI  - GI consult stat  - NPO except meds  - monitor closely    GLF w/ no LOC or head trauma: POA. Acute. D/t above most likely. CXR neg.  - UA  - CT head for now  - No focal findings on exam  - fall precautions  - PT    Dm2: POA. Chronic  - SSI  - Hold home meds    I have provided a total of 40 minutes of critical care time.  During this entire length of time I was immediately available to the patient. The reason for providing this level of medical care was due to a critical illness that impaired one or more vital organ systems, such that there was a high probability of imminent or life threatening deterioration in the patient's condition. This care involved high complexity decision making which includes reviewing the  patient's past medical records, current laboratory results, and actual Xray films in order to assess, support vital system function, and to treat this degree of vital organ system failure, and to prevent further life threatening deterioration of the patient’s condition.        POLST: Patient not ready    I have personally reviewed the radiographs, laboratory data in Epic and decisions and statements above are based partially on this personal interpretation.    Code Status: Full Code  DVT Prophylaxis: SCD's  GI Prophylaxis: PPI       Subjective:   CHIEF COMPLAINT: \"fall, legs gave out\"    HISTORY OF PRESENT ILLNESS:     Alex is a 44 y.o.   male with PMHx etoh buse, DM presented with GLF today. Legs gave out. No LOC. In ed had one large bloody emesis (BRB). None since. NO dx of varices or etoh abuse but family states he drinks regularly. Lst drink 2 days ago \"couple of beers\" per patient    No fever, chills, night sweats. No hearing or vision changes. No severe headaches. No confusion. No abdominal pain, diarrhea, constipation. No CP, SOB, PUENTE, LE edema. No dysuria, frequency, flank pain.    Available records were reviewed at the time of H&P.        No past medical history on file.   No past surgical history on file.  Social History     Tobacco Use    Smoking status: Not on file    Smokeless tobacco: Not on file   Substance Use Topics    Alcohol use: Not on file      No family history on file.     No Known Allergies     Prior to Admission medications    Not on File       REVIEW OF SYSTEMS:  See HPI for details      Objective:   VITALS:    Vitals:    09/04/24 2045   BP: 125/89   Pulse: (!) 125   Resp: 20   Temp:    SpO2: 97%     PHYSICAL EXAM:    Physical Exam:    Gen: Shaking, irritable  HEENT:  Pink conjunctivae, EOMI, hearing intact to voice, moist mucous membranes  Resp: No accessory muscle use, clear breath sounds without wheezes rales or rhonchi  Card: No murmurs, normal S1, S2 without thrills, bruits

## 2024-09-05 NOTE — PROGRESS NOTES
Tele CCM Consult Note  Date:2024       Room:[place] [address]  Patient Name:Alex Sanchez     YOB: 1980     Age:44 y.o.        Subjective    CC: fall    HPI:  43 y/o with pmh including EOTH abuse.  In ED pt with BRB x 1.  Last drink 2 days prior to admission.  Family states pt is heavy drinker.      In ED started on CIWA.  Started on PPI and octreotide.  Admitted to ICU for further mgmt.        Objective         Vitals Last 24 Hours:  TEMPERATURE:  Temp  Av.9 °F (37.2 °C)  Min: 98.9 °F (37.2 °C)  Max: 98.9 °F (37.2 °C)  RESPIRATIONS RANGE: Resp  Av  Min: 14  Max: 24  PULSE OXIMETRY RANGE: SpO2  Av.8 %  Min: 97 %  Max: 99 %  PULSE RANGE: Pulse  Av.3  Min: 124  Max: 148  BLOOD PRESSURE RANGE: Systolic (24hrs), Av , Min:116 , Max:126   ; Diastolic (24hrs), Av, Min:69, Max:94    I/O (24Hr):  No intake or output data in the 24 hours ending 24    Objective  Gen: general tremors, awake,  HEENT: NCAT  Chest: symmetrical chest rise  CV: r/r/r  Abd: non-distended  Ext: no c/c/e  Neuro: moves all ext.  No focal deficits.     Labs/Imaging/Diagnostics    Labs:  CBC:  Recent Labs     24   WBC 2.4*  --    RBC 4.43  --    HGB 16.2 15.3   HCT 44.9 42.0   .4*  --    RDW 12.4  --    *  --      CHEMISTRIES:  Recent Labs     24   *   K 3.6      CO2 20*   BUN 4*   CREATININE 0.79   GLUCOSE 197*   MG 1.7     PT/INR:No results for input(s): \"PROTIME\", \"INR\" in the last 72 hours.  APTT:No results for input(s): \"APTT\" in the last 72 hours.  LIVER PROFILE:  Recent Labs     24   AST 58*   ALT 91*   BILITOT 1.6*   ALKPHOS 85       Imaging Last 24 Hours:  CT HEAD WO CONTRAST    Result Date: 2024  EXAM: CT HEAD WO CONTRAST CLINICAL HISTORY: GLF rule out bleed INDICATION: Ground-level fall COMPARISON: None. CT dose reduction was achieved through use of a standardized protocol tailored for this examination and  encounter.      Electronically signed by Venancio Escalante DO on 9/4/24 at 11:27 PM EDT

## 2024-09-05 NOTE — PROGRESS NOTES
[facility]  [address] [place]  [address] [phone]         Hospitalist Progress Note        NAME: Alex Sanchez   :  1980   MRN:  [id number]    Date:  2024     Patient PCP: Marysol Holliday MD    Code Status: Full Code     Isolation Precautions: No active isolations    Barrier(s) to discharge:  Currently not medically stable for discharge  Estimated date of discharge:  2-3 days  Discharge disposition:  Home with family    Assessment/Plan:      Acute delirium tremens,POA.    Appears to be improving.  CIWA score of 1-2  CIWA with benzo and phenobarb taper  Reportedly no previous withdrawal per patient but appears to have poor insight and not good historian.   On admission he was shaking, fall without LOC, irritable. No hallucinations  Can transfer out of ICU to telemetry     Acute UGIB with hematemesis, POA.   ?Variceal bleed S/p octreotide in ED  PPI  Transfuse if Hgb <7 or sympromatic  Monitor Hgb  NPO   Consult(s): GI    Acute GLF w/ no LOC or head trauma, POA   D/t above most likely, however, patient strongly denies having fallen stating that he had sat down  Fall precautions  CT of head and CXR unremarkable  UA pending     Macrocytosis likely a/w alcohol abuse disorder  Check B12 and folate levels   Continue with thiamine and folic acid.    DM2: POA. Chronic, poorly controlled  A1C 9.2 on 2024  Monitor blood glucose levels and ISS coverage  Diabetic diet and Diabetic diet and diabetes education/management consult      F:  None  E:  Hyponatremia  N:  Diet NPO Exceptions are: Sips of Water with Meds     Prophylaxis:  SCD's and H2B/PPI    Consults:   IP CONSULT TO INTENSIVIST  IP CONSULT TO SOCIAL WORK  IP CONSULT TO CRITICAL CARE  IP CONSULT TO GI    Plan discussed:  Pt's condition, Imaging findings, Lab findings, Assessment, and Care Plan with Patient, RN, and Care Manager    Risk of deterioration:  High       Total time: 40 minutes. I  PHENobarbital tablet 32.4 mg  32.4 mg Oral BID    Followed by    [START ON 9/6/2024] PHENobarbital tablet 16.2 mg  16.2 mg Oral BID    PHENobarbital tablet 32.4 mg  32.4 mg Oral Q6H PRN    Followed by    [START ON 9/6/2024] PHENobarbital tablet 16.2 mg  16.2 mg Oral Q6H PRN    pantoprazole (PROTONIX) 40 mg in sodium chloride (PF) 0.9 % 10 mL injection  40 mg IntraVENous Daily    glucose chewable tablet 16 g  4 tablet Oral PRN    dextrose bolus 10% 125 mL  125 mL IntraVENous PRN    Or    dextrose bolus 10% 250 mL  250 mL IntraVENous PRN    glucagon injection 1 mg  1 mg SubCUTAneous PRN    dextrose 10 % infusion   IntraVENous Continuous PRN    insulin lispro (HUMALOG,ADMELOG) injection vial 0-8 Units  0-8 Units SubCUTAneous Q4H        The External notes reviewed, labs, imaging studies, medications, and consultants notes was reviewed by me on: September 5, 2024               ___________________________________________________    Signed:    Galileo Herrera MD

## 2024-09-05 NOTE — CONSULTS
[facility]  Jeromy Weeks M.D.  [phone]                    GASTROENTEROLOGY CONSULTATION NOTE              NAME:  Alex Sanchez   :   1980   MRN:   [id number]       Referring Physician:    Dr Gracia       Consult Date:   2024 9:38 AM    Chief Complaint:    N/v hematemesis         History of Present Illness:    Patient is a 44 y.o.  with PMHx etoh buse, DM presented presyncopal episode witnessed at home yesterday evening. Per chart in the ED had hematemesis ( unknown if BRB or coffee grounds) x 1 . Pt denies any hematemsis. Pt had non bloody emesis overnight. He was admitted to ICU for further evalaution. He has not known hx of liver disease.  NO dx of varices . Per family he drinks regularly. Lst drink 2 days ago \"couple of beers\" per patient. In the ED he was noted HD stable and LFT's mildly elevated 1:1 with TB 1.6, Lipase is normal. BUN is normal and Hb is 12.7. INR 1.1 Platelets 66     No fever, chills, night sweats. No hearing or vision changes. No severe headaches. No confusion. No abdominal pain, diarrhea, constipation. No CP, SOB, PUENTE, LE edema. No dysuria, frequency, flank pain.     PMH:  Past Medical History:   Diagnosis Date    Diabetes (HCC)     ETOH abuse        PSH:  History reviewed. No pertinent surgical history.    Allergies:  No Known Allergies    Home Medications:  None       Hospital Medications:  Current Facility-Administered Medications   Medication Dose Route Frequency    LORazepam (ATIVAN) injection 2 mg  2 mg IntraVENous Once    sodium chloride flush 0.9 % injection 5-40 mL  5-40 mL IntraVENous 2 times per day    sodium chloride flush 0.9 % injection 5-40 mL  5-40 mL IntraVENous PRN    0.9 % sodium chloride infusion   IntraVENous PRN    potassium chloride (KLOR-CON) extended release tablet 40 mEq  40 mEq Oral PRN    Or    potassium bicarb-citric acid (EFFER-K) effervescent tablet 40 mEq  40 mEq Oral PRN    Or    potassium chloride 10 mEq/100 mL IVPB  fever, negative chills, negative weight loss  Eyes:   negative visual changes  ENT:   negative sore throat, tongue or lip swelling  Respiratory:  negative cough, negative dyspnea  Cards:  negative for chest pain, palpitations, lower extremity edema  GI:   See HPI  :  negative for frequency, dysuria  Integument:  negative for rash and pruritus  Heme:  negative for easy bruising and gum/nose bleeding  Musculoskel: negative for myalgias,  back pain and muscle weakness  Neuro: negative for headaches, dizziness, vertigo  Psych:  negative for feelings of anxiety, depression     Objective:   Patient Vitals for the past 8 hrs:   BP Temp Temp src Pulse Resp SpO2 Height   09/05/24 0919 127/80 98.1 °F (36.7 °C) Oral 83 19 100 % --   09/05/24 0830 111/74 -- -- 95 14 98 % --   09/05/24 0815 124/85 -- -- 85 14 99 % --   09/05/24 0800 (!) 141/101 -- -- (!) 110 -- 99 % --   09/05/24 0745 118/87 98.4 °F (36.9 °C) Oral 97 21 99 % --   09/05/24 0615 -- -- -- 93 -- 98 % --   09/05/24 0600 -- -- -- 97 16 97 % --   09/05/24 0545 -- -- -- (!) 124 -- 98 % --   09/05/24 0530 -- -- -- 99 -- 98 % --   09/05/24 0515 -- -- -- (!) 104 18 97 % --   09/05/24 0500 -- -- -- 98 17 98 % --   09/05/24 0445 -- -- -- (!) 106 17 98 % --   09/05/24 0430 127/71 -- -- 99 -- 97 % --   09/05/24 0415 127/85 -- -- (!) 103 18 97 % --   09/05/24 0400 124/81 -- -- (!) 102 17 96 % --   09/05/24 0345 110/67 -- -- (!) 103 15 98 % --   09/05/24 0337 113/70 -- -- (!) 104 -- -- --   09/05/24 0330 113/70 -- -- (!) 101 16 99 % --   09/05/24 0315 110/67 -- -- (!) 104 18 98 % --   09/05/24 0303 -- -- -- -- -- -- 1.727 m (5' 8\")   09/05/24 0300 127/81 -- Oral (!) 105 20 98 % --   09/05/24 0200 109/61 -- -- (!) 115 18 92 % --   09/05/24 0145 -- -- -- (!) 117 -- 97 % --     09/05 0701 - 09/05 1900  In: -   Out: 250 [Urine:250]  No intake/output data recorded.    EXAM:     NEURO-alert, oriented x3, affect appropriate, no focal neurological deficits, moves all extremities well,

## 2024-09-05 NOTE — ED NOTES
TRANSFER - OUT REPORT:    Verbal report given to LETICIA Dave on Alex Sanchez  being transferred to [facility] for routine progression of patient care       Report consisted of patient's Situation, Background, Assessment and   Recommendations(SBAR).     Information from the following report(s) Nurse Handoff Report, ED SBAR, Intake/Output, MAR, Recent Results, and Cardiac Rhythm ST  was reviewed with the receiving nurse.      Lines:   Peripheral IV 09/04/24 Right;Anterior Forearm (Active)        Opportunity for questions and clarification was provided.      Patient transported with:  RN

## 2024-09-06 VITALS
HEART RATE: 86 BPM | TEMPERATURE: 98.1 F | OXYGEN SATURATION: 99 % | HEIGHT: 68 IN | SYSTOLIC BLOOD PRESSURE: 99 MMHG | DIASTOLIC BLOOD PRESSURE: 58 MMHG | RESPIRATION RATE: 12 BRPM

## 2024-09-06 LAB
ALBUMIN SERPL-MCNC: 2.6 G/DL (ref 3.5–5)
ALBUMIN/GLOB SERPL: 0.8 (ref 1.1–2.2)
ALP SERPL-CCNC: 39 U/L (ref 45–117)
ALT SERPL-CCNC: 71 U/L (ref 12–78)
ANION GAP SERPL CALC-SCNC: 7 MMOL/L (ref 2–12)
AST SERPL-CCNC: 47 U/L (ref 15–37)
BASOPHILS # BLD: 0.1 K/UL (ref 0–0.1)
BASOPHILS NFR BLD: 1 % (ref 0–1)
BILIRUB SERPL-MCNC: 1 MG/DL (ref 0.2–1)
BUN SERPL-MCNC: 7 MG/DL (ref 6–20)
BUN/CREAT SERPL: 13 (ref 12–20)
CALCIUM SERPL-MCNC: 8.6 MG/DL (ref 8.5–10.1)
CHLORIDE SERPL-SCNC: 100 MMOL/L (ref 97–108)
CO2 SERPL-SCNC: 25 MMOL/L (ref 21–32)
CREAT SERPL-MCNC: 0.54 MG/DL (ref 0.7–1.3)
DIFFERENTIAL METHOD BLD: ABNORMAL
EOSINOPHIL # BLD: 0.1 K/UL (ref 0–0.4)
EOSINOPHIL NFR BLD: 1 % (ref 0–7)
ERYTHROCYTE [DISTWIDTH] IN BLOOD BY AUTOMATED COUNT: 12.3 % (ref 11.5–14.5)
GLOBULIN SER CALC-MCNC: 3.3 G/DL (ref 2–4)
GLUCOSE BLD STRIP.AUTO-MCNC: 152 MG/DL (ref 65–117)
GLUCOSE BLD STRIP.AUTO-MCNC: 177 MG/DL (ref 65–117)
GLUCOSE BLD STRIP.AUTO-MCNC: 228 MG/DL (ref 65–117)
GLUCOSE SERPL-MCNC: 145 MG/DL (ref 65–100)
HCT VFR BLD AUTO: 35.4 % (ref 36.6–50.3)
HGB BLD-MCNC: 12.9 G/DL (ref 12.1–17)
IMM GRANULOCYTES # BLD AUTO: 0.1 K/UL (ref 0–0.04)
IMM GRANULOCYTES NFR BLD AUTO: 1 % (ref 0–0.5)
LYMPHOCYTES # BLD: 0.9 K/UL (ref 0.8–3.5)
LYMPHOCYTES NFR BLD: 10 % (ref 12–49)
MAGNESIUM SERPL-MCNC: 1.7 MG/DL (ref 1.6–2.4)
MCH RBC QN AUTO: 37.1 PG (ref 26–34)
MCHC RBC AUTO-ENTMCNC: 36.4 G/DL (ref 30–36.5)
MCV RBC AUTO: 101.7 FL (ref 80–99)
MONOCYTES # BLD: 0.6 K/UL (ref 0–1)
MONOCYTES NFR BLD: 7 % (ref 5–13)
NEUTS SEG # BLD: 7.4 K/UL (ref 1.8–8)
NEUTS SEG NFR BLD: 80 % (ref 32–75)
NRBC # BLD: 0 K/UL (ref 0–0.01)
NRBC BLD-RTO: 0 PER 100 WBC
PLATELET # BLD AUTO: 59 K/UL (ref 150–400)
PLATELET COMMENT: ABNORMAL
POTASSIUM SERPL-SCNC: 3.4 MMOL/L (ref 3.5–5.1)
PROT SERPL-MCNC: 5.9 G/DL (ref 6.4–8.2)
RBC # BLD AUTO: 3.48 M/UL (ref 4.1–5.7)
RBC MORPH BLD: ABNORMAL
SERVICE CMNT-IMP: ABNORMAL
SODIUM SERPL-SCNC: 132 MMOL/L (ref 136–145)
WBC # BLD AUTO: 9.2 K/UL (ref 4.1–11.1)

## 2024-09-06 PROCEDURE — 87086 URINE CULTURE/COLONY COUNT: CPT

## 2024-09-06 PROCEDURE — 6360000002 HC RX W HCPCS: Performed by: HOSPITALIST

## 2024-09-06 PROCEDURE — 36415 COLL VENOUS BLD VENIPUNCTURE: CPT

## 2024-09-06 PROCEDURE — 82962 GLUCOSE BLOOD TEST: CPT

## 2024-09-06 PROCEDURE — 6370000000 HC RX 637 (ALT 250 FOR IP): Performed by: HOSPITALIST

## 2024-09-06 PROCEDURE — 6360000002 HC RX W HCPCS: Performed by: STUDENT IN AN ORGANIZED HEALTH CARE EDUCATION/TRAINING PROGRAM

## 2024-09-06 PROCEDURE — 85025 COMPLETE CBC W/AUTO DIFF WBC: CPT

## 2024-09-06 PROCEDURE — 80053 COMPREHEN METABOLIC PANEL: CPT

## 2024-09-06 PROCEDURE — 2580000003 HC RX 258: Performed by: HOSPITALIST

## 2024-09-06 PROCEDURE — 6370000000 HC RX 637 (ALT 250 FOR IP): Performed by: STUDENT IN AN ORGANIZED HEALTH CARE EDUCATION/TRAINING PROGRAM

## 2024-09-06 PROCEDURE — 2580000003 HC RX 258: Performed by: STUDENT IN AN ORGANIZED HEALTH CARE EDUCATION/TRAINING PROGRAM

## 2024-09-06 PROCEDURE — 83735 ASSAY OF MAGNESIUM: CPT

## 2024-09-06 PROCEDURE — 94761 N-INVAS EAR/PLS OXIMETRY MLT: CPT

## 2024-09-06 RX ORDER — THIAMINE MONONITRATE (VIT B1) 100 MG
100 TABLET ORAL DAILY
Qty: 30 TABLET | Refills: 1 | Status: SHIPPED | OUTPATIENT
Start: 2024-09-07

## 2024-09-06 RX ORDER — MULTIVITAMIN WITH IRON
1 TABLET ORAL DAILY
Qty: 30 TABLET | Refills: 1 | Status: SHIPPED | OUTPATIENT
Start: 2024-09-07

## 2024-09-06 RX ORDER — POTASSIUM CHLORIDE 750 MG/1
40 TABLET, EXTENDED RELEASE ORAL
Status: COMPLETED | OUTPATIENT
Start: 2024-09-06 | End: 2024-09-06

## 2024-09-06 RX ORDER — PANTOPRAZOLE SODIUM 40 MG/1
40 TABLET, DELAYED RELEASE ORAL 2 TIMES DAILY
Qty: 60 TABLET | Refills: 0 | Status: SHIPPED | OUTPATIENT
Start: 2024-09-06

## 2024-09-06 RX ORDER — FOLIC ACID 1 MG/1
1 TABLET ORAL DAILY
Qty: 30 TABLET | Refills: 1 | Status: SHIPPED | OUTPATIENT
Start: 2024-09-07

## 2024-09-06 RX ORDER — PHENOBARBITAL 16.2 MG/1
16.2 TABLET ORAL EVERY 12 HOURS
Qty: 4 TABLET | Refills: 0 | Status: SHIPPED | OUTPATIENT
Start: 2024-09-06 | End: 2024-09-08

## 2024-09-06 RX ORDER — CEFDINIR 300 MG/1
300 CAPSULE ORAL 2 TIMES DAILY
Qty: 20 CAPSULE | Refills: 0 | Status: SHIPPED | OUTPATIENT
Start: 2024-09-06 | End: 2024-09-16

## 2024-09-06 RX ADMIN — POTASSIUM CHLORIDE 40 MEQ: 750 TABLET, EXTENDED RELEASE ORAL at 11:41

## 2024-09-06 RX ADMIN — FOLIC ACID 1 MG: 1 TABLET ORAL at 09:38

## 2024-09-06 RX ADMIN — PANTOPRAZOLE SODIUM 40 MG: 40 INJECTION, POWDER, FOR SOLUTION INTRAVENOUS at 09:37

## 2024-09-06 RX ADMIN — THERA TABS 1 TABLET: TAB at 09:38

## 2024-09-06 RX ADMIN — PHENOBARBITAL 32.4 MG: 32.4 TABLET ORAL at 09:38

## 2024-09-06 RX ADMIN — Medication 100 MG: at 09:38

## 2024-09-06 RX ADMIN — WATER 1000 MG: 1 INJECTION INTRAMUSCULAR; INTRAVENOUS; SUBCUTANEOUS at 11:42

## 2024-09-06 RX ADMIN — POTASSIUM CHLORIDE 40 MEQ: 750 TABLET, EXTENDED RELEASE ORAL at 09:37

## 2024-09-06 NOTE — DISCHARGE INSTRUCTIONS
Patient Discharge Instructions    Alex Sanchez / [id number] : 1980    Admitted 2024 Discharged: 2024       Discharge Medications:   It is important that you take the medication exactly as they are prescribed.   Keep your medication in the bottles provided by the pharmacist and keep a list of the medication names, dosages, and times to be taken in your wallet.   Do not take other medications without consulting your doctor.   Call your PCP for medication refills      What to do at Home    Take medications as prescribed and follow up with PCP and Gastroenterology.  Call your PCP for refills of your medications.      Recommended Diet:  ADULT DIET; Regular; 4 carb choices (60 gm/meal)       Recommended Activity: Activity as tolerated      **If you experience any of the following symptoms chest pain, shortness of breath, fevers, chills, bleeding, dizziness, signs of infection, worsening pain, and symptoms of stroke, please follow up with PCP or go to the nearest ER.**    Mr. Sanchez, it was a pleasure to have taking care of you.  I am glad you are feeling better.  I hope that you continue to do well especially with your recovery.       Galileo Herrera MD     2024

## 2024-09-06 NOTE — DISCHARGE SUMMARY
[facility]  [address] [place], [place]  [id number] [phone]       PHYSICIAN DISCHARGE SUMMARY        Name:  Alex Sanchez, 44 y.o.  :    1980  MRN:    [id number]  PCP:    Marysol Holliday MD      Date of Admission: 2024  Date of Discharge:   2024 11:52 AM  Disposition:  Home  Condition:  improved, stable, and good      Patient PCP:  Marysol Holliday MD    Emergency Contact:    Extended Emergency Contact Information  Primary Emergency Contact: SETHCALLUM  Mobile Phone: [phone]  Relation: Parent  Preferred language: English   needed? No        Code Status:  Full Code     Admission Status: Inpatient       Consultations:  IP CONSULT TO INTENSIVIST  IP CONSULT TO SOCIAL WORK  IP CONSULT TO CRITICAL CARE  IP CONSULT TO GI  IP CONSULT TO DIABETES MANAGEMENT    Reason for Admission:   Delirium tremens ([facility]) [F10.931]  DTs (delirium tremens) ([facility]) [F10.931]  Upper GI bleed [K92.2]    Discharge Diagnoses:    Principal Problem:    Delirium tremens (HCC)  Resolved Problems:    * No resolved hospital problems. *      Excerpted HPI from H&P of Brady Butcher MD:  Alex is a 44 y.o.   male with PMHx etoh buse, DM presented with GLF today. Legs gave out. No LOC. In ed had one large bloody emesis (BRB). None since. NO dx of varices or etoh abuse but family states he drinks regularly. Lst drink 2 days ago \"couple of beers\" per patient     No fever, chills, night sweats. No hearing or vision changes. No severe headaches. No confusion. No abdominal pain, diarrhea, constipation. No CP, SOB, PUENTE, LE edema. No dysuria, frequency, flank pain.     Available records were reviewed at the time of H&P.    Brief Hospital Course:  Patient was admitted for the issues above.  Hemoglobin remained stable had no further episodes of hematemesis.  He was in the ICU and was started on CIWA protocol (which was low) with phenobarbital taper.  He states that

## 2024-09-07 LAB
BACTERIA SPEC CULT: ABNORMAL
BACTERIA SPEC CULT: NORMAL
CC UR VC: ABNORMAL
SERVICE CMNT-IMP: ABNORMAL
SERVICE CMNT-IMP: NORMAL

## 2024-12-10 ENCOUNTER — EXTERNAL HOSPITAL ADMISSION (OUTPATIENT)
Facility: CLINIC | Age: 44
End: 2024-12-10

## 2024-12-10 ENCOUNTER — CASE MANAGEMENT (OUTPATIENT)
Facility: CLINIC | Age: 44
End: 2024-12-10

## 2024-12-10 ENCOUNTER — CLINICAL DOCUMENTATION (OUTPATIENT)
Facility: CLINIC | Age: 44
End: 2024-12-10

## 2024-12-10 ENCOUNTER — TELEPHONE (OUTPATIENT)
Facility: CLINIC | Age: 44
End: 2024-12-10

## 2024-12-10 NOTE — TELEPHONE ENCOUNTER
English called from [facility] to notify Dr. Holliday pt passed away 24 and to confirm he is still a pt of the practice to have death certificate signed.    Advised pt last seen by Dr. Holliday for virtual visit 21 and Dr. Holliday left practice 3/27/24.       will try to find another provider to sign death certificate. Elisa

## (undated) DEVICE — BLUNTFILL WITH FILTER: Brand: MONOJECT

## (undated) DEVICE — SYRINGE MEDICAL 3ML CLEAR PLASTIC STANDARD NON CONTROL LUERLOCK TIP DISPOSABLE

## (undated) DEVICE — ELECTRODE,RADIOTRANSLUCENT,FOAM,3PK: Brand: MEDLINE

## (undated) DEVICE — SOLIDIFIER FLD 2OZ 1500CC N DISINF IN BTL DISP SAFESORB

## (undated) DEVICE — SET ADMIN 16ML TBNG L100IN 2 Y INJ SITE IV PIGGY BK DISP (ORDER IN MULIPLES OF 48)

## (undated) DEVICE — 1200 GUARD II KIT W/5MM TUBE W/O VAC TUBE: Brand: GUARDIAN

## (undated) DEVICE — KIT COLON W/ 1.1OZ LUB AND 2 END

## (undated) DEVICE — BLUNTFILL: Brand: MONOJECT

## (undated) DEVICE — IV STRT KT 3282] LSL INDUSTRIES INC]

## (undated) DEVICE — SYRINGE MED 5ML STD CLR PLAS LUERLOCK TIP N CTRL DISP

## (undated) DEVICE — CANNULA CUSH AD W/ 14FT TBG

## (undated) DEVICE — CATHETER IV 22GA L1IN OD0.8382-0.9144MM ID0.6096-0.6858MM 382523

## (undated) DEVICE — BITEBLOCK ENDOSCP 60FR MAXI WHT POLYETH STURDY W/ VELC WVN